# Patient Record
Sex: FEMALE | Race: WHITE
[De-identification: names, ages, dates, MRNs, and addresses within clinical notes are randomized per-mention and may not be internally consistent; named-entity substitution may affect disease eponyms.]

---

## 2020-01-30 ENCOUNTER — HOSPITAL ENCOUNTER (EMERGENCY)
Dept: HOSPITAL 41 - JD.ED | Age: 30
Discharge: HOME | End: 2020-01-30
Payer: COMMERCIAL

## 2020-01-30 VITALS — DIASTOLIC BLOOD PRESSURE: 59 MMHG | HEART RATE: 72 BPM | SYSTOLIC BLOOD PRESSURE: 96 MMHG

## 2020-01-30 DIAGNOSIS — Z88.0: ICD-10-CM

## 2020-01-30 DIAGNOSIS — Z91.018: ICD-10-CM

## 2020-01-30 DIAGNOSIS — L03.114: Primary | ICD-10-CM

## 2020-01-30 PROCEDURE — 96372 THER/PROPH/DIAG INJ SC/IM: CPT

## 2020-01-30 PROCEDURE — 99283 EMERGENCY DEPT VISIT LOW MDM: CPT

## 2020-01-30 NOTE — EDM.PDOC
ED HPI GENERAL MEDICAL PROBLEM





- General


Chief Complaint: Skin Complaint


Stated Complaint: POSSIBLE MRSA


Time Seen by Provider: 01/30/20 18:32


Source of Information: Reports: Patient, RN Notes Reviewed


History Limitations: Reports: No Limitations





- History of Present Illness


INITIAL COMMENTS - FREE TEXT/NARRATIVE: 





Patient is a 29-year-old female who presents to the ED for the evaluation of a 

cellulitis on her left upper inner arm.  Patient was evaluated at the [facility] this morning, she was placed on Bactrim, and the area was marked on the 

borders the patient notes that this area started as a lump, is been present for 

around 2 weeks.  But last night before work she popped this with a pin, and try 

to squeeze it.  She states that when she lanced it, there was a lot of drainage

, and pus that came out.  The patient states that the redness did extend past 

the margins, and she was told to be seen by a doctor if the infection has spread

, so she comes to the ER for evaluation.  She also notes that there was a 

culture done from the drainage at the clinic.


Treatments PTA: Reports: Other (see below)


Other Treatments PTA: sulfa twice today


  ** Left Upper Arm


Pain Score (Numeric/FACES): 6





- Related Data


 Allergies











Allergy/AdvReac Type Severity Reaction Status Date / Time


 


Penicillins Allergy  Nausea and Verified 03/10/15 13:48





   Vomiting  


 


mushroom Allergy  Hives Uncoded 03/10/15 13:48











Home Meds: 


 Home Meds





Levothyroxine [Synthroid] 88 mcg PO DAILY 01/30/20 [History]


Sulfamethoxazole/Trimethoprim [Sulfamethoxazole-Tmp Ds Tablet] 1 tab PO BID 01/ 30/20 [History]











Past Medical History





- Past Health History


Medical/Surgical History: Denies Medical/Surgical History





- Past Surgical History


HEENT Surgical History: Reports: Eye Surgery


Other HEENT Surgeries/Procedures: lazy eye correction





Social & Family History





- Tobacco Use


Smoking Status *Q: Never Smoker





- Caffeine Use


Caffeine Use: Reports: Coffee, Energy Drinks, Tea





- Recreational Drug Use


Recreational Drug Use: No





ED ROS GENERAL





- Review of Systems


Review Of Systems: See Below


Constitutional: Reports: Fever.  Denies: Chills


Respiratory: Denies: Shortness of Breath


Cardiovascular: Denies: Chest Pain


GI/Abdominal: Denies: Abdominal Pain, Diarrhea, Nausea, Vomiting


Skin: Reports: Erythema (Left upper inner arm), Wound





ED EXAM, SKIN/RASH


Exam: See Below


Exam Limited By: No Limitations


General Appearance: Alert, WD/WN, No Apparent Distress


Throat/Mouth: Normal Inspection, Normal Lips, Normal Teeth, Normal Gums, Normal 

Oropharynx, Normal Voice, No Airway Compromise


Respiratory/Chest: No Respiratory Distress, Lungs Clear, Normal Breath Sounds, 

No Accessory Muscle Use, Chest Non-Tender


Cardiovascular: Normal Peripheral Pulses, Regular Rate, Rhythm, No Murmur


GI/Abdominal: Normal Bowel Sounds, Soft, Non-Tender, No Distention, No Mass


Neurological: Alert, Oriented, Normal Cognition, No Motor/Sensory Deficits


Psychiatric: Normal Affect, Normal Mood


Skin: Warm, Dry, Intact, Erythema (that extends past borders marked at this am'

s clinic fising), Wound/Incision (area that is scabbed over but appears to be 

healing okay.)


Location, Skin: Upper Extremity, Right





Course





- Vital Signs


Last Recorded V/S: 





 Last Vital Signs











Temp  99.0 F   01/30/20 17:56


 


Pulse  72   01/30/20 17:56


 


Resp  20   01/30/20 17:56


 


BP  96/59 L  01/30/20 17:56


 


Pulse Ox  96   01/30/20 17:56














- Orders/Labs/Meds


Orders: 





 Active Orders 24 hr











 Category Date Time Status


 


 Lidocaine 1% [Xylocaine-MPF 1%] Med  01/30/20 18:43 Once





 2 ml INJECT ONETIME ONE   


 


 cefTRIAXone [Rocephin] Med  01/30/20 18:42 Once





 2 gm IM ONETIME ONE   








 Medication Orders





Lidocaine HCl (Xylocaine-Mpf 1%)  2 ml INJECT ONETIME ONE


   Stop: 01/30/20 18:44








Meds: 





Medications











Generic Name Dose Route Start Last Admin





  Trade Name Freq  PRN Reason Stop Dose Admin


 


Lidocaine HCl  2 ml  01/30/20 18:43  





  Xylocaine-Mpf 1%  INJECT  01/30/20 18:44  





  ONETIME ONE   





     





     





     





     














Discontinued Medications














Generic Name Dose Route Start Last Admin





  Trade Name Freq  PRN Reason Stop Dose Admin


 


Ceftriaxone Sodium  2 gm  01/30/20 18:42  





  Rocephin  IM  01/30/20 18:43  





  ONETIME ONE   





     





     





     





     














- Re-Assessments/Exams


Free Text/Narrative Re-Assessment/Exam: 





01/30/20 18:52


Patient presents to the ED for the evaluation of increasing skin redness due to 

cellulitis.  Patient did just start her Bactrim this morning, and has only 

taken 2 doses, I did assure her that Bactrim is not appropriate medication for 

cellulitis coverage..  I will however give her a shot of Rocephin in the ED to 

help maybe expedite the she was okay with this plan.  We will have her 

discharged home and watch the area closely she will follow other general 

recommendations as well.





Departure





- Departure


Time of Disposition: 18:54


Disposition: Home, Self-Care 01


Condition: Fair


Clinical Impression: 


Cellulitis of upper extremity


Qualifiers:


 Laterality: right Qualified Code(s): L03.113 - Cellulitis of right upper limb








- Discharge Information


*PRESCRIPTION DRUG MONITORING PROGRAM REVIEWED*: No


*COPY OF PRESCRIPTION DRUG MONITORING REPORT IN PATIENT GIANFRANCO: No


Referrals: 


Lynda Cox NP [Primary Care Provider] - 


Additional Instructions: 


You were evaluated in the ER today regarding your ongoing cellulitis.





Bactrim can take up to 48 hours to really start working, please keep taking 

this medication as directed unless told otherwise by the provider that ordered 

the culture of your wound.





You were given a injection of Rocephin in the ED to help cover you during this 

initial period.





You may try to use cold packs to the area to help relieve the swelling, and try 

to use some ibuprofen for inflammation relief.  Recommend 600 mg every 6 hours 

do not exceed 3200 mg ibuprofen in a 24-hour time span.





Please return to the ED at any time if your symptoms change or worsen.





Sepsis Event Note





- Evaluation


Sepsis Screening Result: No Definite Risk





- Focused Exam


Vital Signs: 





 Vital Signs











  Temp Pulse Resp BP Pulse Ox


 


 01/30/20 17:56  99.0 F  72  20  96/59 L  96











Date Exam was Performed: 01/30/20


Time Exam was Performed: 18:43





- My Orders


Last 24 Hours: 





My Active Orders





01/30/20 18:42


cefTRIAXone [Rocephin]   2 gm IM ONETIME ONE 





01/30/20 18:43


Lidocaine 1% [Xylocaine-MPF 1%]   2 ml INJECT ONETIME ONE 














- Assessment/Plan


Last 24 Hours: 





My Active Orders





01/30/20 18:42


cefTRIAXone [Rocephin]   2 gm IM ONETIME ONE 





01/30/20 18:43


Lidocaine 1% [Xylocaine-MPF 1%]   2 ml INJECT ONETIME ONE

## 2020-03-03 ENCOUNTER — HOSPITAL ENCOUNTER (EMERGENCY)
Dept: HOSPITAL 41 - JD.ED | Age: 30
Discharge: HOME | End: 2020-03-03
Payer: COMMERCIAL

## 2020-03-03 VITALS — HEART RATE: 73 BPM | SYSTOLIC BLOOD PRESSURE: 155 MMHG | DIASTOLIC BLOOD PRESSURE: 111 MMHG

## 2020-03-03 DIAGNOSIS — E03.9: ICD-10-CM

## 2020-03-03 DIAGNOSIS — Z88.0: ICD-10-CM

## 2020-03-03 DIAGNOSIS — Z91.018: ICD-10-CM

## 2020-03-03 DIAGNOSIS — H81.12: Primary | ICD-10-CM

## 2020-03-03 DIAGNOSIS — Z79.899: ICD-10-CM

## 2020-03-03 PROCEDURE — 96361 HYDRATE IV INFUSION ADD-ON: CPT

## 2020-03-03 PROCEDURE — 84443 ASSAY THYROID STIM HORMONE: CPT

## 2020-03-03 PROCEDURE — 36415 COLL VENOUS BLD VENIPUNCTURE: CPT

## 2020-03-03 PROCEDURE — 80053 COMPREHEN METABOLIC PANEL: CPT

## 2020-03-03 PROCEDURE — 96374 THER/PROPH/DIAG INJ IV PUSH: CPT

## 2020-03-03 PROCEDURE — 83735 ASSAY OF MAGNESIUM: CPT

## 2020-03-03 PROCEDURE — 85025 COMPLETE CBC W/AUTO DIFF WBC: CPT

## 2020-03-03 PROCEDURE — 99284 EMERGENCY DEPT VISIT MOD MDM: CPT

## 2020-03-03 PROCEDURE — 96375 TX/PRO/DX INJ NEW DRUG ADDON: CPT

## 2020-03-03 PROCEDURE — 86140 C-REACTIVE PROTEIN: CPT

## 2020-03-03 NOTE — EDM.PDOC
ED HPI GENERAL MEDICAL PROBLEM





- General


Chief Complaint: General


Stated Complaint: DIZZY/NECK NUMB


Time Seen by Provider: 03/03/20 17:31


Source of Information: Reports: Patient, Family


History Limitations: Reports: No Limitations





- History of Present Illness


INITIAL COMMENTS - FREE TEXT/NARRATIVE: 


29-year-old female attends the ED due to acute onset of severe vertigo at about 

1300 hrs. today.  She woke from sleep and immediately felt vertiginous with the 

room spinning.  She then vomited on 3 occasions of bilious material.  She 

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  No recent falls or closed 

head injuries.  No recent upper respiratory tract infections or sinus 

congestion.  She has never had vertigo symptoms ever before.  She states if she 

holds real still she has no symptoms.  It is worse if she tries to lie down.  

Believes she is listing a little bit more to the right and so does her spouse.  

She has not fallen but nearly has.





Onset: Today


Onset Date: 03/03/20


Onset Time: 13:00


Duration: Hour(s):


Location: Reports: Generalized (Sudden onset of severe vertigo associate with 

nausea vomiting and inability to walk.)


Quality: Reports: Other


Severity: Severe (Symptoms go away if she holds real still.)


Improves with: Reports: Rest


Worsens with: Reports: Other (Thing real still and sitting upright.)


Context: Denies: Activity, Exercise, Lifting, Sick Contact, Trauma, Other


Associated Symptoms: Reports: Loss of Appetite, Malaise, Nausea/Vomiting (

Associated with development of vertigo).  Denies: No Other Symptoms, Confusion, 

Chest Pain, Cough, cough w sputum, Diaphoresis, Fever/Chills, Headaches, Rash, 

Seizure, Shortness of Breath, Syncope, Weakness


Treatments PTA: Reports: Other (see below) (Thing would stay down.)





- Related Data


 Allergies











Allergy/AdvReac Type Severity Reaction Status Date / Time


 


Penicillins Allergy  Nausea and Verified 03/03/20 15:29





   Vomiting  


 


mushroom Allergy  Hives Uncoded 03/03/20 15:29











Home Meds: 


 Home Meds





Levothyroxine [Synthroid] 88 mcg PO DAILY 01/30/20 [History]


Meclizine HCl 25 mg PO Q8H #15 tablet 03/03/20 [Rx]











Past Medical History





- Past Health History


Medical/Surgical History: Denies Medical/Surgical History


Endocrine/Metabolic History: Reports: Hypothyroidism (She is on levothyroxine 

supplement.), Obesity/BMI 30+





- Past Surgical History


HEENT Surgical History: Reports: Eye Surgery


Other HEENT Surgeries/Procedures: lazy eye correction





Social & Family History





- Tobacco Use


Smoking Status *Q: Never Smoker





- Caffeine Use


Caffeine Use: Reports: Coffee, Other


Other Caffeine Use: caffeinated crystal light





- Recreational Drug Use


Recreational Drug Use: No





- Living Situation & Occupation


Living situation: Reports: Single


Occupation: Employed





ED ROS GENERAL





- Review of Systems


Review Of Systems: See Below


Constitutional: Reports: Malaise, Decreased Appetite.  Denies: Fever, Chills


HEENT: Reports: No Symptoms, Vertigo (Cute onset since awakening at 1300 hrs. 

today)


Respiratory: Reports: No Symptoms


Cardiovascular: Reports: No Symptoms


Endocrine: Reports: Fatigue


GI/Abdominal: Reports: Nausea, Vomiting (Did 3 times large amount so she with 

the development of sudden onset of vertigo)


: Reports: No Symptoms


Musculoskeletal: Reports: No Symptoms


Skin: Reports: No Symptoms


Neurological: Reports: Dizziness (She is actually vertigo.  She is better if 

she holds really still and does lie down.), Difficulty Walking, Gait Disturbance


Psychiatric: Reports: No Symptoms (Onset associate with vertigo)


Hematologic/Lymphatic: Reports: No Symptoms


Immunologic: Reports: No Symptoms





ED EXAM, GENERAL





- Physical Exam


Exam: See Below


Exam Limited By: No Limitations


General Appearance: Alert, WD/WN, Moderate Distress, Other (There is 35.9 heart 

rate 73 respiratory 16 initial BP was elevated 155 111 but came down to 138/94.)


Eye Exam: Bilateral Eye: PERRL


Ears: Normal TMs


Throat/Mouth: Normal Inspection, Normal Lips, Normal Oropharynx, Other


Head: Atraumatic, Normocephalic


Neck: Normal Inspection (Filler is in the midline), Supple, Non-Tender, Full 

Range of Motion.  No: Lymphadenopathy (L), Lymphadenopathy (R)


Respiratory/Chest: No Respiratory Distress, Lungs Clear, Normal Breath Sounds, 

No Accessory Muscle Use, Chest Non-Tender


Cardiovascular: Normal Peripheral Pulses, Regular Rate, Rhythm, No Edema, No 

Gallop, No Murmur, No Rub


Peripheral Pulses: 3+: Posterior Tibial (L), Posterior Tibial (R), Dorsalis 

Pedis (L), Dorsalis Pedis (R)


GI/Abdominal: Normal Bowel Sounds, Soft, Non-Tender, No Organomegaly, Other (

Abdominal girth limits ability to palpate solid organs.)


Back Exam: Normal Inspection, Full Range of Motion.  No: CVA Tenderness (L), 

CVA Tenderness (R)


Extremities: Normal Inspection, Normal Range of Motion, Non-Tender


Neurological: Alert, Oriented, CN II-XII Intact, Normal Cognition, Other (

Patient has a markedly ataxic gait wandering to the right.  She has normal 

rapid alternating movements normal finger-to-nose assessment no pronator drift.)

.  No: Normal Gait


Psychiatric: Normal Affect, Normal Mood


Skin Exam: Warm, Dry, Intact, Normal Color, No Rash





Course





- Vital Signs


Last Recorded V/S: 


 Last Vital Signs











Temp  35.9 C L  03/03/20 15:25


 


Pulse  73   03/03/20 15:25


 


Resp  16   03/03/20 15:25


 


BP  155/111 H  03/03/20 15:25


 


Pulse Ox  97   03/03/20 15:25














- Orders/Labs/Meds


Orders: 


 Active Orders 24 hr











 Category Date Time Status


 


 Dextrose 5%-0.9% NaCl [Dextrose 5%-Normal Saline] 1,000 Med  03/03/20 17:45 

Active





 ml   





 IV ASDIRECTED   








 Medication Orders





Dextrose/Sodium Chloride (Dextrose 5%-Normal Saline)  1,000 mls @ 500 mls/hr IV 

ASDIRECTED FARIDEH


   Last Admin: 03/03/20 17:47  Dose: 500 mls/hr








Labs: 


 Laboratory Tests











  03/03/20 03/03/20 Range/Units





  17:45 17:45 


 


WBC  9.08   (3.98-10.04)  K/mm3


 


RBC  5.21   (3.98-5.22)  M/mm3


 


Hgb  15.1   (11.2-15.7)  gm/dl


 


Hct  46.1 H   (34.1-44.9)  %


 


MCV  88.5   (79.4-94.8)  fl


 


MCH  29.0   (25.6-32.2)  pg


 


MCHC  32.8   (32.2-35.5)  g/dl


 


RDW Std Deviation  46.8 H   (36.4-46.3)  fL


 


Plt Count  277   (182-369)  K/mm3


 


MPV  10.3   (9.4-12.3)  fl


 


Neut % (Auto)  66.5   (34.0-71.1)  %


 


Lymph % (Auto)  24.6   (19.3-51.7)  %


 


Mono % (Auto)  6.9   (4.7-12.5)  %


 


Eos % (Auto)  1.5   (0.7-5.8)  


 


Baso % (Auto)  0.2   (0.1-1.2)  %


 


Neut # (Auto)  6.03   (1.56-6.13)  K/mm3


 


Lymph # (Auto)  2.23   (1.18-3.74)  K/mm3


 


Mono # (Auto)  0.63 H   (0.24-0.36)  K/mm3


 


Eos # (Auto)  0.14   (0.04-0.36)  K/mm3


 


Baso # (Auto)  0.02   (0.01-0.08)  K/mm3


 


Sodium   141  (136-145)  mEq/L


 


Potassium   4.1  (3.5-5.1)  mEq/L


 


Chloride   105  ()  mEq/L


 


Carbon Dioxide   25  (21-32)  mEq/L


 


Anion Gap   15.1 H  (5-15)  


 


BUN   11  (7-18)  mg/dL


 


Creatinine   0.7  (0.55-1.02)  mg/dL


 


Est Cr Clr Drug Dosing   84.41  mL/min


 


Estimated GFR (MDRD)   > 60  (>60)  mL/min


 


BUN/Creatinine Ratio   15.7  (14-18)  


 


Glucose   96  ()  mg/dL


 


Calcium   9.2  (8.5-10.1)  mg/dL


 


Magnesium   2.0  (1.8-2.4)  mg/dl


 


Total Bilirubin   0.5  (0.2-1.0)  mg/dL


 


AST   20  (15-37)  U/L


 


ALT   37  (14-59)  U/L


 


Alkaline Phosphatase   86  ()  U/L


 


C-Reactive Protein   2.9 H*  (<1.0)  mg/dL


 


Total Protein   8.3 H  (6.4-8.2)  g/dl


 


Albumin   3.7  (3.4-5.0)  g/dl


 


Globulin   4.6  gm/dL


 


Albumin/Globulin Ratio   0.8 L  (1-2)  


 


TSH 3rd Generation   3.106  (0.358-3.74)  uIU/mL











Meds: 


Medications











Generic Name Dose Route Start Last Admin





  Trade Name Freq  PRN Reason Stop Dose Admin


 


Dextrose/Sodium Chloride  1,000 mls @ 500 mls/hr  03/03/20 17:45  03/03/20 17:47





  Dextrose 5%-Normal Saline  IV   500 mls/hr





  ASDIRECTED FARIDEH   Administration





     





     





     





     














Discontinued Medications














Generic Name Dose Route Start Last Admin





  Trade Name Kristen  PRN Reason Stop Dose Admin


 


Lorazepam  1 mg  03/03/20 17:33  03/03/20 17:45





  Ativan  IVPUSH  03/03/20 17:34  1 mg





  ONETIME ONE   Administration





     





     





     





     


 


Metoclopramide HCl  10 mg  03/03/20 17:33  03/03/20 17:45





  Reglan  IVPUSH  03/03/20 17:34  10 mg





  ONETIME ONE   Administration





     





     





     





     














- Radiology Interpretation


Free Text/Narrative:: 


29-year-old female presents to the ED with acute onset of severe vertigo.  When 

she tries to walk she is listing to the right side.  No recent upper 

respiratory tract infections or sinus congestion.  No past history of vertigo.  

Vertigo was associate with development of nausea and vomiting when she awoke 

around 1300 hrs. today.  Is a mild associated headache.  The neuro exam is 

completely normal and she has no symptoms of she is at rest and sitting 

upright.  Worse if she tries to lie down.  Tessman is acute onset of peripheral 

vertigo.  Plan IV D5 normal saline at 500 mils per hour.  Will be given Reglan 

10 mg IV with Ativan 1 mg IV for symptom relief.  Routine labs will be 

performed including a TSH as she is on supplement.








- Re-Assessments/Exams


Free Text/Narrative Re-Assessment/Exam: 





03/03/20 19:33 did very well when she was up walking.  She still has mild 

vertigo symptoms but is 80 to 90% better than she was when she came.  Diagnosis 

is benign positional vertigo.  Patient will be placed on meclizine 25 mg every 

8 hours for the next 5 days to try and control the vertigo symptoms.  She had 

to work tonight but is found replacement for work and is off for the next week.

  She is not better in 5 to 6 days time she needs to be seen again with a view 

to getting into physiotherapy to introduce Epley's maneuvers.Is really normal 

white count at 9.08.  66.5% neutrophils.  Hemoglobin 15.1 with hematocrit of 

46.1 platelet count 277,000.  Sodium 141 with a potassium of 4.1.  Chloride 105 

with a bicarb of 25.  Anion gap is 15.1 BUN is 11 with a creatinine of 0.7.  

GFR is greater than 60.  Glucose is 96 with a calcium of 9.2 magnesium is 2.0 

liver function is normal C-reactive protein mildly elevated at 2.9 total 

protein 8.3 albumin fraction 3.7 TSH was 3.106--in the normal range.














Departure





- Departure


Time of Disposition: 19:34


Disposition: Home, Self-Care 01


Condition: Fair


Clinical Impression: 


Benign paroxysmal positional vertigo


Qualifiers:


 Laterality: left Qualified Code(s): H81.12 - Benign paroxysmal vertigo, left 

ear








- Discharge Information


*PRESCRIPTION DRUG MONITORING PROGRAM REVIEWED*: Not Applicable


*COPY OF PRESCRIPTION DRUG MONITORING REPORT IN PATIENT GIANFRANCO: Not Applicable


Prescriptions: 


Meclizine HCl 25 mg PO Q8H #15 tablet


Instructions:  How to Perform the Epley Maneuver, Benign Positional Vertigo


Referrals: 


Lynda Cox NP [Primary Care Provider] - 


Forms:  ED Department Discharge


Additional Instructions: 


Evaluation in the emergency room today in regards to development of severe 

vertigo symptoms since awakening at 1300 hrs. today.  You are fine when you 

went to bed.  No signs of infection in the ear nose or throat system was 

identified.  Neuro exam was normal.  You were treated with IV fluids and 

medication Reglan 10 mg with Ativan 1 mg to bring the vertigo under control.  

He still had mild vertigo when up walking an hour and a half later but markedly 

improved compared to when you presented to the ED.  You need to take a 

medication called meclizine 25 mg tablet every 8 hours for the next 5 days.  

They can anytime when you get home tonight after you pick it up from the 

pharmacy.  Activity as tolerated but should be careful climbing on top of 

anything of course that as you might easily lose her balance and fall.  He 

should be careful about driving a motor vehicle if you feel unsafe I have 

vertigo symptoms stopping starting at a red light or stop sign or turning her 

head to check for vehicles and the other mar cause vertigo symptoms you you 

should not drive.  Not markedly improved in 6 days time you should be seen 

again with a view to getting into physical therapy to have Epley's maneuvers 

carried out.  I provided information  about performing  Epley's maneuvers that 

she can try at home.





Sepsis Event Note





- Evaluation


Sepsis Screening Result: No Definite Risk





- Focused Exam


Vital Signs: 


 Vital Signs











  Temp Pulse Resp BP Pulse Ox


 


 03/03/20 15:25  35.9 C L  73  16  155/111 H  97











Date Exam was Performed: 03/03/20


Time Exam was Performed: 19:43





- My Orders


Last 24 Hours: 


My Active Orders





03/03/20 17:45


Dextrose 5%-0.9% NaCl [Dextrose 5%-Normal Saline] 1,000 ml IV ASDIRECTED 














- Assessment/Plan


Last 24 Hours: 


My Active Orders





03/03/20 17:45


Dextrose 5%-0.9% NaCl [Dextrose 5%-Normal Saline] 1,000 ml IV ASDIRECTED

## 2020-11-08 ENCOUNTER — HOSPITAL ENCOUNTER (EMERGENCY)
Dept: HOSPITAL 41 - JD.ED | Age: 30
Discharge: HOME | End: 2020-11-08
Payer: COMMERCIAL

## 2020-11-08 VITALS — SYSTOLIC BLOOD PRESSURE: 148 MMHG | HEART RATE: 104 BPM | DIASTOLIC BLOOD PRESSURE: 111 MMHG

## 2020-11-08 DIAGNOSIS — E03.9: ICD-10-CM

## 2020-11-08 DIAGNOSIS — Z23: ICD-10-CM

## 2020-11-08 DIAGNOSIS — U07.1: Primary | ICD-10-CM

## 2020-11-08 DIAGNOSIS — Z87.891: ICD-10-CM

## 2020-11-08 DIAGNOSIS — Z91.018: ICD-10-CM

## 2020-11-08 DIAGNOSIS — Z88.0: ICD-10-CM

## 2020-11-08 PROCEDURE — G0008 ADMIN INFLUENZA VIRUS VAC: HCPCS

## 2020-11-08 PROCEDURE — U0002 COVID-19 LAB TEST NON-CDC: HCPCS

## 2020-11-08 NOTE — EDM.PDOC
ED HPI GENERAL MEDICAL PROBLEM





- General


Chief Complaint: Respiratory Problem


Stated Complaint: COUGH/HEAD ACHE


Time Seen by Provider: 11/08/20 08:46


Source of Information: Reports: Patient


History Limitations: Reports: No Limitations





- History of Present Illness


INITIAL COMMENTS - FREE TEXT/NARRATIVE: 





Ms. Gage is a very pleasant 30-year-old woman who now presents to the ED with a 

cough productive of greenish sputum, headache, and watery diarrhea since 

proximately Wednesday, 11/4/2020.  She has had nausea today, but no vomiting, 

and she reports keeping fluids down without any difficulty.  No associated 

dyspnea.  She also reports having nasal and sinus congestion productive of clear

sputum.  She states that she has applied Vicks VapoRub and taken NyQuil, which 

seem to have helped somewhat.





Here in the ED, the patient's initial BP is found to be mildly elevated at 

148/111, with a mild tachycardia of 104 bpm.  She is otherwise hemodynamically 

stable, afebrile, saturating 93% on room air.





Prior to 11/4/2020, the patient denies having a recent fever, chills, sore 

throat, ear pain, nasal or sinus congestion, cough, dyspnea, chest pain, 

palpitations, nausea, vomiting, constipation, diarrhea, abdominal pain, urinary 

symptoms, recent weight gain or weight loss, recent bloody bowel movements or 

black bowel movements, recent joint aches, headaches, or rashes.








The patient's PCP is PAMELA Mayen.


She has not received an influenza vaccine this season, but agreed to receive one

here today.








  ** Headache


Pain Score (Numeric/FACES): 5





- Related Data


                                    Allergies











Allergy/AdvReac Type Severity Reaction Status Date / Time


 


Penicillins Allergy  Nausea and Verified 11/08/20 08:48





   Vomiting  


 


mushroom Allergy  Hives Uncoded 03/03/20 15:29











Home Meds: 


                                    Home Meds





Levothyroxine [Synthroid] 88 mcg PO DAILY 01/30/20 [History]


Meclizine HCl 25 mg PO Q8H #15 tablet 03/03/20 [Rx]











Past Medical History


Endocrine/Metabolic History: Reports: Hypothyroidism (She is on levothyroxine 

supplement.), Obesity/BMI 30+





- Past Surgical History


HEENT Surgical History: Reports: Eye Surgery (Strabismus corrective surgery, 

bilaterally, x 2), Oral Surgery (dental extractions)





Social & Family History





- Tobacco Use


Tobacco Use Status *Q: Former Tobacco User


Years of Tobacco use: 14


Packs/Tins Daily: 0.2


Month/Year Tobacco Last Used: Quit 2018





- Caffeine Use


Caffeine Use: Reports: Coffee, Other


Other Caffeine Use: caffeinated crystal light





- Alcohol Use


Alcohol Use History: Yes


Alcohol Use Frequency: Rarely





- Recreational Drug Use


Recreational Drug Use: No





- Living Situation & Occupation


Living situation: Reports: Single, with Significant Other (Fianc)


Occupation: Employed ([employer] )





ED ROS GENERAL





- Review of Systems


Review Of Systems: Comprehensive ROS is negative, except as noted in HPI.





ED EXAM, GENERAL





- Physical Exam


Exam: See Below


Exam Limited By: No Limitations


General Appearance: Alert, WD/WN, No Apparent Distress


Eye Exam: Bilateral Eye: EOMI, Normal Inspection


Ears: Normal External Exam, Hearing Grossly Normal


Nose: Normal Inspection


Throat/Mouth: Normal Inspection, Normal Lips, Normal Voice, No Airway Compromise


Head: Atraumatic, Normocephalic


Neck: Normal Inspection, Full Range of Motion


Respiratory/Chest: No Respiratory Distress, Lungs Clear, Normal Breath Sounds, 

No Accessory Muscle Use.  No: Decreased Breath Sounds, Crackles, Rhonchi, 

Wheezing, Stridor, Prolonged Expiration


Cardiovascular: Normal Peripheral Pulses, Regular Rate, Rhythm, No Edema, No 

Gallop, No JVD, No Murmur, No Rub


Peripheral Pulses: 3+: Radial (L), Radial (R)


GI/Abdominal: Normal Bowel Sounds, Soft, Non-Tender, No Organomegaly, No 

Distention, No Abnormal Bruit, No Mass


Back Exam: Normal Inspection, Full Range of Motion, NT


Extremities: Normal Inspection, Normal Range of Motion, No Pedal Edema, Normal 

Capillary Refill


Neurological: Alert, Oriented, Normal Cognition, No Motor/Sensory Deficits


Psychiatric: Normal Affect


Skin Exam: Warm, Dry, Intact, Normal Color, No Rash





Course





- Vital Signs


Last Recorded V/S: 


                                Last Vital Signs











Temp  36.9 C   11/08/20 08:46


 


Pulse  104 H  11/08/20 08:46


 


Resp  20   11/08/20 08:46


 


BP  148/111 H  11/08/20 08:46


 


Pulse Ox  93 L  11/08/20 08:46














- Orders/Labs/Meds


Labs: 


                                Laboratory Tests











  11/08/20 11/08/20 Range/Units





  09:19 09:19 


 


WBC  5.88   (3.98-10.04)  K/mm3


 


RBC  5.37 H   (3.98-5.22)  M/mm3


 


Hgb  15.1   (11.2-15.7)  gm/dl


 


Hct  47.4 H   (34.1-44.9)  %


 


MCV  88.3   (79.4-94.8)  fl


 


MCH  28.1   (25.6-32.2)  pg


 


MCHC  31.9 L   (32.2-35.5)  g/dl


 


RDW Std Deviation  47.2 H   (36.4-46.3)  fL


 


Plt Count  188  D   (182-369)  K/mm3


 


MPV  10.4   (9.4-12.3)  fl


 


Neutrophils % (Manual)  76 H   (40-60)  %


 


Band Neutrophils %  0   (0-10)  %


 


Lymphocytes % (Manual)  22   (20-40)  %


 


Atypical Lymphs %  0   %


 


Monocytes % (Manual)  2   (2-10)  %


 


Eosinophils % (Manual)  0 L   (0.7-5.8)  %


 


Basophils % (Manual)  0 L   (0.1-1.2)  


 


Platelet Estimate  Adequate   


 


RBC Morph Comment  Normal   


 


Sodium   140  (136-145)  mEq/L


 


Potassium   4.0  (3.5-5.1)  mEq/L


 


Chloride   105  ()  mEq/L


 


Carbon Dioxide   22  (21-32)  mEq/L


 


Anion Gap   17.0 H  (5-15)  


 


BUN   10  (7-18)  mg/dL


 


Creatinine   0.8  (0.55-1.02)  mg/dL


 


Est Cr Clr Drug Dosing   76.89  mL/min


 


Estimated GFR (MDRD)   > 60  (>60)  mL/min


 


BUN/Creatinine Ratio   12.5 L  (14-18)  


 


Glucose   117 H  ()  mg/dL


 


Calcium   8.7  (8.5-10.1)  mg/dL


 


Magnesium   1.8  (1.8-2.4)  mg/dl


 


Total Bilirubin   0.3  (0.2-1.0)  mg/dL


 


AST   27  (15-37)  U/L


 


ALT   43  (14-59)  U/L


 


Alkaline Phosphatase   70  ()  U/L


 


Total Protein   8.2  (6.4-8.2)  g/dl


 


Albumin   3.5  (3.4-5.0)  g/dl


 


Globulin   4.7  gm/dL


 


Albumin/Globulin Ratio   0.7 L  (1-2)  











Meds: 


Medications














Discontinued Medications














Generic Name Dose Route Start Last Admin





  Trade Name Kristen  PRN Reason Stop Dose Admin


 


Influenza Virus Vaccine  1 each  11/08/20 09:06 





  Pharmacy To Dose - Influenza Vaccine  [facility]  11/08/20 09:07 





  ONETIME ONE  


 


Influenza Virus Vaccine  60 mcg  11/08/20 09:15  11/08/20 09:25





  Fluzone Quad [id number] Syringe  IM  11/08/20 09:16  60 mcg





  .ONCE ONE   Administration














- Re-Assessments/Exams


Free Text/Narrative Re-Assessment/Exam: 





11/08/20 09:06


As above, the patient has had 4 days of a cough, occasionally productive of 

greenish sputum, headache, and watery diarrhea.  She had nausea today, without 

emesis.  No associated dyspnea.  She has also had clear sinus drainage.  Her 

physical exam, including auscultation of her lungs, is completely unremarkable, 

although her oxygen saturation is 93% on room air, which is low for someone her 

age.  Her symptoms are concerning for COVID-19, however, it is also possible 

that she has influenza.  I have therefore ordered a work-up that includes an 

influenza swab, a send-out swab for the SARS-CoV-2 virus, and a chest x-ray.  

Because of her diarrhea, I have also added blood work that includes a CBC, CMP, 

and magnesium level.








11/08/20 10:13


The patient's CBC is remarkable for a Hct slightly elevated at 47.4, but with a 

Hgb normal at 15.1, with the remainder of her CBC being unremarkable.


Her CMP is remarkable for an anion gap slightly elevated at 17.0, but with a 

bicarbonate normal at 22.  Her blood glucose is slightly elevated 117, with the 

remainder of her CMP being unremarkable.


Her magnesium level is within normal limits at 1.8.


Her influenza swab returned negative.








11/08/20 12:19


2-view chest radiograph is read by Adams as "No acute findings.  Is again" [sic]








11/08/20 12:24


Test results discussed with the patient.  As above, today's work-up is 

completely negative.  I explained that despite the negative work-up, it is 

possible that she is suffering from an ordinary URI, but it is also possible 

that she is suffering from COVID-19.  She should get the results of today's test

 within a few days.  Until that time, she needs to strictly quarantine, and if 

her test returns positive, she will need to remain quarantined until she tests 

negative, twice.  We discussed the option of taking Tylenol for any discomfort. 

 I will write a note for work.





The patient will be given an influenza vaccine prior to discharge.





Departure





- Departure


Time of Disposition: 12:26


Disposition: Home, Self-Care 01


Condition: Good


Clinical Impression: 


 Cough, Headache, Watery diarrhea, Nausea








- Discharge Information


*PRESCRIPTION DRUG MONITORING PROGRAM REVIEWED*: Not Applicable


*COPY OF PRESCRIPTION DRUG MONITORING REPORT IN PATIENT GIANFRANCO: Not Applicable


Instructions:  Cough, Adult, Easy-to-Read


Referrals: 


Gabriella Brambila PA-C [Primary Care Provider] - 


Forms:  ED Department Discharge, ED Return to Work/School Form


Additional Instructions: 


You were seen in the emergency room for 4 days of a cough, headache, watery 

diarrhea, along with nausea today.





Work-up in the ER included blood work, chest x-ray, and influenza swab, and a 

send-out swab for the SARS-CoV-2 virus.





Your entire work-up was unremarkable.  You do not have pneumonia.  You do not 

have influenza.  No significant electrolyte abnormalities were found.





Based on your history, physical exam, and ER tests, you may be suffering from an

ordinary virus, but it is also possible that you are suffering from COVID-19.





We recommend that you strictly quarantine until you get the results of today's 

test for the SARS-CoV-2 virus.  If it returns negative, you may return to work, 

however, if it returns positive, you will need to continue to quarantine until 

you test negative, twice.





As discussed, you may take over-the-counter Tylenol as needed for discomfort of 

fever, although some recommend that you not take any anti-fever medicine.





Stay adequately hydrated.





If any other problems, please do not hesitate to return to the ER.





Sepsis Event Note (ED)





- Evaluation


Sepsis Screening Result: No Definite Risk

## 2020-11-09 NOTE — CR
PROCEDURE INFORMATION:

Exam: XR Chest, 2 Views

Exam date and time: 11/8/2020 9:32 AM

Age: 30 years old

Clinical indication: Cough and shortness of breath; Patient HX: Cough x4days



TECHNIQUE:

Imaging protocol: XR of the chest

Views: 2 views.



COMPARISON:

No relevant prior studies available.



FINDINGS:

Lungs: Unremarkable. No consolidation.

Pleural space: Unremarkable. No pleural effusion. No pneumothorax.

Heart/Mediastinum: Unremarkable. No cardiomegaly.

Bones/joints: Unremarkable.



IMPRESSION:

No acute findings.

Is again



Thank you for allowing us to participate in the care of your patient.

Dictated and Authenticated by: Henry Rossi MD

11/08/2020 1:10 PM Central Time ([place] & [place])

ERNESTO